# Patient Record
Sex: MALE | Race: WHITE
[De-identification: names, ages, dates, MRNs, and addresses within clinical notes are randomized per-mention and may not be internally consistent; named-entity substitution may affect disease eponyms.]

---

## 2018-08-29 ENCOUNTER — HOSPITAL ENCOUNTER (INPATIENT)
Dept: HOSPITAL 74 - YASAS | Age: 33
LOS: 5 days | Discharge: HOME | DRG: 773 | End: 2018-09-03
Attending: INTERNAL MEDICINE | Admitting: INTERNAL MEDICINE
Payer: COMMERCIAL

## 2018-08-29 VITALS — BODY MASS INDEX: 23.6 KG/M2

## 2018-08-29 DIAGNOSIS — F41.9: ICD-10-CM

## 2018-08-29 DIAGNOSIS — K21.9: ICD-10-CM

## 2018-08-29 DIAGNOSIS — F11.23: Primary | ICD-10-CM

## 2018-08-29 DIAGNOSIS — F17.213: ICD-10-CM

## 2018-08-29 DIAGNOSIS — F32.9: ICD-10-CM

## 2018-08-29 DIAGNOSIS — R63.4: ICD-10-CM

## 2018-08-29 PROCEDURE — HZ2ZZZZ DETOXIFICATION SERVICES FOR SUBSTANCE ABUSE TREATMENT: ICD-10-PCS

## 2018-08-29 NOTE — HP
COWS





- Scale


Resting Pulse: 0= [place] 80 or Below


Sweatin=Flushed/Facial Moisture


Restless Observation: 0= Sits Still


Pupil Size: 1= Pupils >than Normal


Bone or Joint Aches: 4=Acute Joint/Muscle Pain


Runny Nose/ Eye Tearin= Runny Nose/Eyes


GI Upset > 30mins: 2= Nausea/Diarrhea (diarrhea x 3)


Tremor Observation: 0= None


Yawning Observation: 0= None


Anxiety or Irritability: 4=Extreme Anxiety


Goose Flesh Skin: 0=Smooth Skin


COWS Score: 15





Admission ROS [facility]





- [facility]


Chief Complaint: 





Heroin withdrawal symptoms


Allergies/Adverse Reactions: 


 Allergies











Allergy/AdvReac Type Severity Reaction Status Date / Time


 


No Known Allergies Allergy   Verified 18 20:39











History of Present Illness: 





33 years old male with over 10 years history of heroin dependence is seeking 

admission to detox. Patient was last detoxed at [facility]. 

He reports 2 years of sobriety. He has medical medical history of anxiety, GERD 

and depression. He denies suicide attempt and suicidal ideation at this time.


Exam Limitations: No Limitations





- Ebola screening


Have you traveled outside of the country in the last 21 days: No


Have you had contact with anyone from an Ebola affected area: No


Have you been sick,other than usual withdrawal symptoms: No


Do you have a fever: No





- Review of Systems


Constitutional: Chills, Malaise, Night Sweats, Weakness, Unexplained wgt Loss (

10-15 pounds)


EENT: reports: Sinus Pressure


Respiratory: reports: No Symptoms reported


Cardiac: reports: No Symptoms Reported


GI: reports: Diarrhea (x 3), Nausea, Poor Appetite, Poor Fluid Intake, 

Abdominal cramping


: reports: No Symptoms Reported


Musculoskeletal: reports: Back Pain, Muscle Pain, Muscle Weakness


Integumentary: reports: Dryness


Neuro: reports: Headache, Tremors


Endocrine: reports: No Symptoms Reported


Hematology: reports: No Symptoms Reported


Psychiatric: reports: Orientated x3, Anxious, Depressed


Other Systems: Reviewed and Negative





Patient History





- Patient Medical History


Hx Anemia: No


Hx Asthma: No


Hx Chronic Obstructive Pulmonary Disease (COPD): No


Hx Cancer: No


Hx Cardiac Disorders: No


Hx Congestive Heart Failure: No


Hx Hypertension: No


Hx Hypercholesterolemia: No


Hx Pacemaker: No


HX Cerebrovascular Accident: No


Hx Seizures: No


Hx Dementia: No


Hx Diabetes: No


Hx Gastrointestinal Disorders: No


Hx Liver Disease: No


Hx Genitourinary Disorders: No


Hx Sexually Transmitted Disorders: No


Hx Renal Disease (ESRD): No


Hx Thyroid Disease: No


Hx Human Immunodeficiency Virus (HIV): No


Hx Hepatitis C: No


Hx Depression: Yes (Not on medication)


Hx Suicide Attempt: No (Denies suicide attempt and suicidal ideation at this 

time)


Hx Bipolar Disorder: No


Hx Schizophrenia: No


Other Medical History: ANXIETY -  Not on medication





- Patient Surgical History


Past Surgical History: No


Hx Neurologic Surgery: No


Hx Cataract Extraction: No


Hx Cardiac Surgery: No


Hx Lung Surgery: No


Hx Abdominal Surgery: No


Hx Appendectomy: No


Hx Cholecystectomy: No


Hx Genitourinary Surgery: No


Hx Orthopedic Surgery: No


Anesthesia Reaction: No





- PPD History


Previous Implant?: Yes


Documented Results: Negative w/proof


Date: 17


PPD to be Administered?: Yes





- Reproductive History


Patient is a Female of Child Bearing Age (11 -55 yrs old): No (Male)





- Smoking Cessation


Smoking history: Current every day smoker


Have you smoked in the past 12 months: Yes


Aproximately how many cigarettes per day: 10


Cigars Per Day: 0


Hx Chewing Tobacco Use: No


Initiated information on smoking cessation: Yes


'Breaking Loose' booklet given: 18





- Substance & Tx. History


Hx Alcohol Use: No


Hx Substance Use: Yes


Substance Use Type: Heroin


Hx Substance Use Treatment: Yes ([facility])





- Substances Abused


  ** Heroin


Route: Inhalation


Frequency: Daily


Amount used: 5 bags


Age of first use: 20


Date of Last Use: 18





Family Disease History





- Family Disease History


Family Disease History: Diabetes: Father, Heart Disease: Father, Other: Mother (

kidney)





Admission Physical Exam [facility]





- Vital Signs


Vital Signs: 


 Vital Signs - 24 hr











  18





  18:24


 


Temperature 97 F L


 


Pulse Rate 70


 


Respiratory 18





Rate 


 


Blood Pressure 129/70














- Physical


General Appearance: Yes: Moderate Distress, Tremorous, Irritable, Sweating, 

Anxious


HEENTM: Yes: EOMI, Normal ENT Inspection, Normocephalic, Normal Voice, CLINT


Respiratory: Yes: Lungs Clear, Normal Breath Sounds, No Respiratory Distress


Neck: Yes: Supple


Breast: Yes: Breast Exam Deferred


Cardiology: Yes: Regular Rhythm, Regular Rate


Abdominal: Yes: Normal Bowel Sounds, Soft


Genitourinary: Yes: Within Normal Limits


Neurological: Yes: Fully Oriented, Alert, Normal Mood/Affect


Integumentary: Yes: Normal Color, Warm


Lymphatic: Yes: Within Normal Limits





- Diagnostic


(1) Anxiety


Current Visit: Yes   Status: Chronic   





(2) Depression


Current Visit: Yes   Status: Chronic   





(3) GERD (gastroesophageal reflux disease)


Current Visit: Yes   Status: Chronic   


Qualifiers: 


   Esophagitis presence: without esophagitis   Qualified Code(s): K21.9 - Gastro

-esophageal reflux disease without esophagitis   





(4) Nicotine dependence


Current Visit: Yes   Status: Chronic   


Qualifiers: 


   Nicotine product type: cigarettes   Substance use status: in withdrawal   

Qualified Code(s): F17.213 - Nicotine dependence, cigarettes, with withdrawal   





(5) Opioid dependence with withdrawal


Current Visit: Yes   Status: Chronic   





Cleared for Admission S





- Detox or Rehab


[facility] Level of Care: Medically Managed


Detox Regimen/Protocol: Methadone





[facility] Breath Alcohol Content


Breath Alcohol Content: 0





Urine Drug Screen





- Results


Drug Screen Negative: No


Urine Drug Screen Results: OPI-Opiates, OXY-Oxycodone

## 2018-08-30 LAB
ALBUMIN SERPL-MCNC: 3.4 G/DL (ref 3.4–5)
ALP SERPL-CCNC: 82 U/L (ref 45–117)
ALT SERPL-CCNC: 27 U/L (ref 12–78)
ANION GAP SERPL CALC-SCNC: 8 MMOL/L (ref 8–16)
APPEARANCE UR: (no result)
AST SERPL-CCNC: 18 U/L (ref 15–37)
BILIRUB SERPL-MCNC: 1.3 MG/DL (ref 0.2–1)
BILIRUB UR STRIP.AUTO-MCNC: NEGATIVE MG/DL
BUN SERPL-MCNC: 13 MG/DL (ref 7–18)
CALCIUM SERPL-MCNC: 8.7 MG/DL (ref 8.5–10.1)
CHLORIDE SERPL-SCNC: 106 MMOL/L (ref 98–107)
CO2 SERPL-SCNC: 28 MMOL/L (ref 21–32)
COLOR UR: YELLOW
CREAT SERPL-MCNC: 0.7 MG/DL (ref 0.7–1.3)
DEPRECATED RDW RBC AUTO: 13.4 % (ref 11.9–15.9)
GLUCOSE SERPL-MCNC: 68 MG/DL (ref 74–106)
HCT VFR BLD CALC: 39.1 % (ref 35.4–49)
HGB BLD-MCNC: 12.9 GM/DL (ref 11.7–16.9)
KETONES UR QL STRIP: NEGATIVE
LEUKOCYTE ESTERASE UR QL STRIP.AUTO: NEGATIVE
MCH RBC QN AUTO: 28.9 PG (ref 25.7–33.7)
MCHC RBC AUTO-ENTMCNC: 32.9 G/DL (ref 32–35.9)
MCV RBC: 88.1 FL (ref 80–96)
NITRITE UR QL STRIP: NEGATIVE
PH UR: 7 [PH] (ref 5–8)
PLATELET # BLD AUTO: 290 K/MM3 (ref 134–434)
PMV BLD: 9 FL (ref 7.5–11.1)
POTASSIUM SERPLBLD-SCNC: 4.5 MMOL/L (ref 3.5–5.1)
PROT SERPL-MCNC: 6.4 G/DL (ref 6.4–8.2)
PROT UR QL STRIP: NEGATIVE
PROT UR QL STRIP: NEGATIVE
RBC # BLD AUTO: 4.44 M/MM3 (ref 4–5.6)
SODIUM SERPL-SCNC: 142 MMOL/L (ref 136–145)
SP GR UR: 1.01 (ref 1–1.03)
UROBILINOGEN UR STRIP-MCNC: NEGATIVE MG/DL (ref 0.2–1)
WBC # BLD AUTO: 7.5 K/MM3 (ref 4–10)

## 2018-08-30 RX ADMIN — Medication SCH MG: at 22:05

## 2018-08-30 RX ADMIN — TOLNAFTATE SCH APPLIC: 10 CREAM TOPICAL at 14:13

## 2018-08-30 RX ADMIN — NICOTINE SCH: 14 PATCH, EXTENDED RELEASE TRANSDERMAL at 10:32

## 2018-08-30 RX ADMIN — TOLNAFTATE SCH APPLIC: 10 CREAM TOPICAL at 22:06

## 2018-08-30 RX ADMIN — Medication SCH TAB: at 10:31

## 2018-08-30 NOTE — CONSULT
[facility] Psychiatric Consult





- Data


Date of interview: 08/30/18


Admission source: [facility]


Identifying data: Patient is a 33 year old male, , father of one, 

domiciled, and financially supported by family. This is one of multiple 

admissions for patient. Pt. admitted to  for opiate dependence.


Substance Abuse History: - Smoking Cessation.  Smoking history: Current every 

day smoker.  Have you smoked in the past 12 months: Yes.  Aproximately how many 

cigarettes per day: 10.  Cigars Per Day: 0.  Hx Chewing Tobacco Use: No.  

Initiated information on smoking cessation: Yes.  'Breaking Loose' booklet given

: 08/29/18.  - Substance & Tx. History.  Hx Alcohol Use: No.  Hx Substance Use: 

Yes.  Substance Use Type: Heroin.  Hx Substance Use Treatment: Yes ([facility] Felix).  - Substances Abused.  ** Heroin.  Route: Inhalation.  Frequency: 

Daily.  Amount used: 5 bags.  Age of first use: 20.  Date of Last Use: 08/29/18


Medical History: denies.


Psychiatric History: Patient's first psychiatric contact was at 17 years of age 

while in detox at [facility]. Pt. states he was retained on a 

psychiatric unit for fourteen days. He was diagnosed with Bipolar disorder and 

prescribed risperdal. Following discharge, pt. continued follow up care at 

[facility] outpatient clinic in which he was reevaulated and told by a 

psychiatrist that he was misdiagnose and did not have a diagnoses of bipolar 

disorder. Eventually patient discontinued outpatient psychiatric care and 

reports being mentally stable. Pt. is currently attending the [facility] 

outpatient program in [facility] and as per the requirement of the 

program see's a psychiatrist once a month. Pt. is not taking psychotropic 

medications.


Physical/Sexual Abuse/Trauma History: denies.





Mental Status Exam





- Mental Status Exam


Alert and Oriented to: Time, Place, Person


Cognitive Function: Good


Patient Appearance: Well Groomed


Mood: Hopeful


Affect: Appropriate


Patient Behavior: Appropriate, Cooperative


Speech Pattern: Clear, Appropriate


Voice Loudness: Normal


Thought Process: Intact, Goal Oriented


Thought Disorder: Not Present


Hallucinations: Denies


Suicidal Ideation: Denies


Homicidal Ideation: Denies


Insight/Judgement: Poor


Sleep: Fair


Appetite: Good


Muscle strength/Tone: Normal


Gait/Station: Normal





Psychiatric Findings





- Problem List (Axis 1, 2,3)


(1) Opioid dependence with withdrawal


Current Visit: Yes   Status: Acute   





(2) Nicotine dependence


Current Visit: Yes   Status: Chronic   


Qualifiers: 


   Nicotine product type: cigarettes   Substance use status: in withdrawal   

Qualified Code(s): F17.213 - Nicotine dependence, cigarettes, with withdrawal   





- Initial Treatment Plan


Initial Treatment Plan: Psychoeducation provided. Detoxification in progress. 

Observation.

## 2018-08-30 NOTE — PN
[facility] CIWA





- CIWA Score


Nausea/Vomiting: 3


Muscle Tremors: None


Anxiety: 3


Agitation: 3


Paroxysmal Sweats: 2


Orientation: 0-Oriented


Tacttile Disturbances: 2-Mild Itch/Numbness/Burn


Auditory Disturbances: 0-None


Visual Disturbances: 2-Mild Sensitivity


Headache: 0-None Present


CIWA-Ar Total Score: 15





[facility] COWS





- Scale


Resting Pulse: 0= [place] 80 or Below


Sweatin= Chills/Flushing





[facility] Progress Note (SOAP)


Subjective: 





Sweating, Diarrhea, Nausea.


Objective: 


PATIENT A & O X 3, OBSERVED AMBULATING ON UNIT. NO ACUTE DISTRESS.





18 17:26


 Vital Signs











Temperature  97.7 F   18 17:12


 


Pulse Rate  66   18 17:12


 


Respiratory Rate  18   18 17:12


 


Blood Pressure  107/66   18 17:12


 


O2 Sat by Pulse Oximetry (%)      








 Laboratory Tests











  18





  07:40 07:40 07:40


 


WBC  7.5  


 


RBC  4.44  


 


Hgb  12.9  


 


Hct  39.1  


 


MCV  88.1  


 


MCH  28.9  


 


MCHC  32.9  


 


RDW  13.4  


 


Plt Count  290  


 


MPV  9.0  


 


Sodium   142 


 


Potassium   4.5 


 


Chloride   106 


 


Carbon Dioxide   28 


 


Anion Gap   8 


 


BUN   13 


 


Creatinine   0.7 


 


Creat Clearance w eGFR   > 60 


 


Random Glucose   68 L 


 


Calcium   8.7 


 


Total Bilirubin   1.3 H 


 


AST   18  D 


 


ALT   27  D 


 


Alkaline Phosphatase   82 


 


Total Protein   6.4 


 


Albumin   3.4 


 


RPR Titer    Nonreactive








LABS NOTED.


UA RESULTS PENDING.


18 17:26





Assessment: 





18 17:26


WITHDRAWAL SYMPTOMS.


Plan: 





CONTINUE DETOX.

## 2018-08-31 RX ADMIN — NICOTINE POLACRILEX PRN MG: 2 GUM, CHEWING ORAL at 21:09

## 2018-08-31 RX ADMIN — Medication PRN MG: at 22:24

## 2018-08-31 RX ADMIN — Medication SCH MG: at 22:02

## 2018-08-31 RX ADMIN — TOLNAFTATE SCH APPLIC: 10 CREAM TOPICAL at 10:13

## 2018-08-31 RX ADMIN — NICOTINE POLACRILEX PRN MG: 2 GUM, CHEWING ORAL at 15:37

## 2018-08-31 RX ADMIN — NICOTINE SCH: 14 PATCH, EXTENDED RELEASE TRANSDERMAL at 10:16

## 2018-08-31 RX ADMIN — TOLNAFTATE SCH APPLIC: 10 CREAM TOPICAL at 22:04

## 2018-08-31 RX ADMIN — NICOTINE POLACRILEX PRN MG: 2 GUM, CHEWING ORAL at 12:22

## 2018-08-31 RX ADMIN — Medication SCH TAB: at 10:14

## 2018-09-01 RX ADMIN — NICOTINE SCH: 14 PATCH, EXTENDED RELEASE TRANSDERMAL at 10:12

## 2018-09-01 RX ADMIN — Medication SCH MG: at 22:36

## 2018-09-01 RX ADMIN — NICOTINE POLACRILEX PRN MG: 2 GUM, CHEWING ORAL at 11:16

## 2018-09-01 RX ADMIN — NICOTINE POLACRILEX PRN MG: 2 GUM, CHEWING ORAL at 19:09

## 2018-09-01 RX ADMIN — Medication PRN MG: at 22:37

## 2018-09-01 RX ADMIN — NICOTINE POLACRILEX PRN MG: 2 GUM, CHEWING ORAL at 13:16

## 2018-09-01 RX ADMIN — TOLNAFTATE SCH APPLIC: 10 CREAM TOPICAL at 10:12

## 2018-09-01 RX ADMIN — NICOTINE POLACRILEX PRN MG: 2 GUM, CHEWING ORAL at 08:32

## 2018-09-01 RX ADMIN — Medication SCH TAB: at 10:11

## 2018-09-01 RX ADMIN — TOLNAFTATE SCH APPLIC: 10 CREAM TOPICAL at 22:39

## 2018-09-01 NOTE — PN
[facility] COWS





- Scale


Resting Pulse: 0= [place] 80 or Below


Sweatin= No chills or Flushing


Restless Observation: 0= Sits Still


Pupil Size: 0= Normal to Room Light


Bone or Joint Aches: 0= None


Runny Nose/ Eye Tearin= None


GI Upset > 30mins: 0= None


Tremor Observation of Outstretched Hands: 0= None


Yawning Observation: 1= 1-2x During Session


Anxiety or Irritability: 1=Feels Anxious/Irritable


Goose Flesh Skin: 0=Smooth Skin


COWS Score: 2





[facility] Progress Note (SOAP)


Subjective: 





pt states doing OK, going to outpt program





Obj:


 Vital Signs - 24 hr











  18





  17:16 21:01 00:30


 


Temperature 98.0 F 97.7 F 


 


Pulse Rate 71 71 


 


Respiratory 18 18 18





Rate   


 


Blood Pressure 102/60 111/68 














  18





  03:30 06:16 06:30


 


Temperature  97.4 F L 


 


Pulse Rate  63 


 


Respiratory 18 18 18





Rate   


 


Blood Pressure  97/54 














  18





  11:21 14:31


 


Temperature 97.6 F 98.4 F


 


Pulse Rate 55 L 70


 


Respiratory 18 18





Rate  


 


Blood Pressure 111/69 99/63








 Laboratory Tests











  18





  07:40 07:40 07:40


 


WBC  7.5  


 


RBC  4.44  


 


Hgb  12.9  


 


Hct  39.1  


 


MCV  88.1  


 


MCH  28.9  


 


MCHC  32.9  


 


RDW  13.4  


 


Plt Count  290  


 


MPV  9.0  


 


Sodium   142 


 


Potassium   4.5 


 


Chloride   106 


 


Carbon Dioxide   28 


 


Anion Gap   8 


 


BUN   13 


 


Creatinine   0.7 


 


Creat Clearance w eGFR   > 60 


 


Random Glucose   68 L 


 


Calcium   8.7 


 


Total Bilirubin   1.3 H 


 


AST   18  D 


 


ALT   27  D 


 


Alkaline Phosphatase   82 


 


Total Protein   6.4 


 


Albumin   3.4 


 


Urine Color   


 


Urine Appearance   


 


Urine pH   


 


Ur Specific Gravity   


 


Urine Protein   


 


Urine Glucose (UA)   


 


Urine Ketones   


 


Urine Blood   


 


Urine Nitrite   


 


Urine Bilirubin   


 


Urine Urobilinogen   


 


Ur Leukocyte Esterase   


 


RPR Titer    Nonreactive














  18





  18:00


 


WBC 


 


RBC 


 


Hgb 


 


Hct 


 


MCV 


 


MCH 


 


MCHC 


 


RDW 


 


Plt Count 


 


MPV 


 


Sodium 


 


Potassium 


 


Chloride 


 


Carbon Dioxide 


 


Anion Gap 


 


BUN 


 


Creatinine 


 


Creat Clearance w eGFR 


 


Random Glucose 


 


Calcium 


 


Total Bilirubin 


 


AST 


 


ALT 


 


Alkaline Phosphatase 


 


Total Protein 


 


Albumin 


 


Urine Color  Yellow


 


Urine Appearance  Slcloudy


 


Urine pH  7.0


 


Ur Specific Gravity  1.015


 


Urine Protein  Negative


 


Urine Glucose (UA)  Negative


 


Urine Ketones  Negative


 


Urine Blood  Negative


 


Urine Nitrite  Negative


 


Urine Bilirubin  Negative


 


Urine Urobilinogen  Negative


 


Ur Leukocyte Esterase  Negative


 


RPR Titer 








labs VS WNL





Ass/plan: 33 years old male with over 10 years history of heroin dependence is 

here for detox- pt doing well continue detox protocol

## 2018-09-02 RX ADMIN — TOLNAFTATE SCH: 10 CREAM TOPICAL at 10:05

## 2018-09-02 RX ADMIN — Medication SCH TAB: at 10:05

## 2018-09-02 RX ADMIN — Medication PRN MG: at 22:02

## 2018-09-02 RX ADMIN — NICOTINE POLACRILEX PRN MG: 2 GUM, CHEWING ORAL at 22:02

## 2018-09-02 RX ADMIN — NICOTINE POLACRILEX PRN MG: 2 GUM, CHEWING ORAL at 08:54

## 2018-09-02 RX ADMIN — TOLNAFTATE SCH: 10 CREAM TOPICAL at 22:03

## 2018-09-02 RX ADMIN — NICOTINE SCH: 14 PATCH, EXTENDED RELEASE TRANSDERMAL at 10:05

## 2018-09-02 RX ADMIN — Medication SCH MG: at 22:02

## 2018-09-02 NOTE — PN
[facility] Progress Note (SOAP)


Subjective: 





pt without complaints today- says going for job interview, concerned about 

methadone in urine.





O: 


 Vital Signs - 24 hr











  09/01/18 09/01/18 09/01/18





  11:21 14:31 18:12


 


Temperature 97.6 F 98.4 F 97.6 F


 


Pulse Rate 55 L 70 62


 


Respiratory 18 18 18





Rate   


 


Blood Pressure 111/69 99/63 114/57














  09/01/18 09/02/18 09/02/18





  22:20 00:30 03:30


 


Temperature 98.9 F  


 


Pulse Rate 66  


 


Respiratory 18 18 18





Rate   


 


Blood Pressure 110/66  














  09/02/18





  05:58


 


Temperature 97 F L


 


Pulse Rate 67


 


Respiratory 16





Rate 


 


Blood Pressure 103/61








 Laboratory Tests











  08/30/18 08/30/18 08/30/18





  07:40 07:40 07:40


 


WBC  7.5  


 


RBC  4.44  


 


Hgb  12.9  


 


Hct  39.1  


 


MCV  88.1  


 


MCH  28.9  


 


MCHC  32.9  


 


RDW  13.4  


 


Plt Count  290  


 


MPV  9.0  


 


Sodium   142 


 


Potassium   4.5 


 


Chloride   106 


 


Carbon Dioxide   28 


 


Anion Gap   8 


 


BUN   13 


 


Creatinine   0.7 


 


Creat Clearance w eGFR   > 60 


 


Random Glucose   68 L 


 


Calcium   8.7 


 


Total Bilirubin   1.3 H 


 


AST   18  D 


 


ALT   27  D 


 


Alkaline Phosphatase   82 


 


Total Protein   6.4 


 


Albumin   3.4 


 


Urine Color   


 


Urine Appearance   


 


Urine pH   


 


Ur Specific Gravity   


 


Urine Protein   


 


Urine Glucose (UA)   


 


Urine Ketones   


 


Urine Blood   


 


Urine Nitrite   


 


Urine Bilirubin   


 


Urine Urobilinogen   


 


Ur Leukocyte Esterase   


 


RPR Titer    Nonreactive














  08/30/18





  18:00


 


WBC 


 


RBC 


 


Hgb 


 


Hct 


 


MCV 


 


MCH 


 


MCHC 


 


RDW 


 


Plt Count 


 


MPV 


 


Sodium 


 


Potassium 


 


Chloride 


 


Carbon Dioxide 


 


Anion Gap 


 


BUN 


 


Creatinine 


 


Creat Clearance w eGFR 


 


Random Glucose 


 


Calcium 


 


Total Bilirubin 


 


AST 


 


ALT 


 


Alkaline Phosphatase 


 


Total Protein 


 


Albumin 


 


Urine Color  Yellow


 


Urine Appearance  Slcloudy


 


Urine pH  7.0


 


Ur Specific Gravity  1.015


 


Urine Protein  Negative


 


Urine Glucose (UA)  Negative


 


Urine Ketones  Negative


 


Urine Blood  Negative


 


Urine Nitrite  Negative


 


Urine Bilirubin  Negative


 


Urine Urobilinogen  Negative


 


Ur Leukocyte Esterase  Negative


 


RPR Titer 








A/p: continue detox- pt to go home tomorrow

## 2018-09-03 VITALS — SYSTOLIC BLOOD PRESSURE: 95 MMHG | HEART RATE: 60 BPM | DIASTOLIC BLOOD PRESSURE: 63 MMHG | TEMPERATURE: 97.2 F

## 2018-09-03 NOTE — DS
[facility] Detox Discharge Summary


Admission Date: 


08/29/18





Discharge Date: 09/03/18





- History


Present History: Opioid Dependence


Additional Comments: 





DETOX COMPLETED.  ALERT O X 3. NAD.


Pertinent Past History: 





PLEASE SEE DX BELOW





- Physical Exam Results


Vital Signs: 


 Vital Signs











Temperature  97.2 F L  09/03/18 05:59


 


Pulse Rate  60   09/03/18 05:59


 


Respiratory Rate  18   09/03/18 06:30


 


Blood Pressure  95/63   09/03/18 05:59


 


O2 Sat by Pulse Oximetry (%)      











Pertinent Admission Physical Exam Findings: 





WITHDRAWAL SX





- Treatment


Hospital Course: Detox Protocol Followed, Detoxed Safely, Responded well, 

Discharged Condition Good





- Medication


Discharge Medications: 


Ambulatory Orders





Ranitidine [Zantac -] 150 mg PO BID #60 tablet 06/09/17 











- Diagnosis


(1) Opioid dependence with withdrawal


Status: Acute   





(2) Weight loss


Status: Acute   





(3) GERD (gastroesophageal reflux disease)


Status: Chronic   


Qualifiers: 


   Esophagitis presence: esophagitis presence not specified   Qualified Code(s)

: K21.9 - Gastro-esophageal reflux disease without esophagitis   





(4) Nicotine dependence


Status: Acute   


Qualifiers: 


   Nicotine product type: cigarettes   Substance use status: in withdrawal   

Qualified Code(s): F17.213 - Nicotine dependence, cigarettes, with withdrawal   





- AMA


Did Patient Leave Against Medical Advice: No

## 2018-09-03 NOTE — PN
[facility] Progress Note (SOAP)


Subjective: 





DETOX COMPLETED. ALERT O X 3. NAD. PT HAS A PRIMARY CARE PROVIDER DR NAKIA VILLA AT [facility].


Objective: 





09/03/18 14:49


 Laboratory Tests











  08/30/18 08/30/18 08/30/18





  07:40 07:40 07:40


 


WBC  7.5  


 


RBC  4.44  


 


Hgb  12.9  


 


Hct  39.1  


 


MCV  88.1  


 


MCH  28.9  


 


MCHC  32.9  


 


RDW  13.4  


 


Plt Count  290  


 


MPV  9.0  


 


Sodium   142 


 


Potassium   4.5 


 


Chloride   106 


 


Carbon Dioxide   28 


 


Anion Gap   8 


 


BUN   13 


 


Creatinine   0.7 


 


Creat Clearance w eGFR   > 60 


 


Random Glucose   68 L 


 


Calcium   8.7 


 


Total Bilirubin   1.3 H 


 


AST   18  D 


 


ALT   27  D 


 


Alkaline Phosphatase   82 


 


Total Protein   6.4 


 


Albumin   3.4 


 


Urine Color   


 


Urine Appearance   


 


Urine pH   


 


Ur Specific Gravity   


 


Urine Protein   


 


Urine Glucose (UA)   


 


Urine Ketones   


 


Urine Blood   


 


Urine Nitrite   


 


Urine Bilirubin   


 


Urine Urobilinogen   


 


Ur Leukocyte Esterase   


 


RPR Titer    Nonreactive














  08/30/18





  18:00


 


WBC 


 


RBC 


 


Hgb 


 


Hct 


 


MCV 


 


MCH 


 


MCHC 


 


RDW 


 


Plt Count 


 


MPV 


 


Sodium 


 


Potassium 


 


Chloride 


 


Carbon Dioxide 


 


Anion Gap 


 


BUN 


 


Creatinine 


 


Creat Clearance w eGFR 


 


Random Glucose 


 


Calcium 


 


Total Bilirubin 


 


AST 


 


ALT 


 


Alkaline Phosphatase 


 


Total Protein 


 


Albumin 


 


Urine Color  Yellow


 


Urine Appearance  Slcloudy


 


Urine pH  7.0


 


Ur Specific Gravity  1.015


 


Urine Protein  Negative


 


Urine Glucose (UA)  Negative


 


Urine Ketones  Negative


 


Urine Blood  Negative


 


Urine Nitrite  Negative


 


Urine Bilirubin  Negative


 


Urine Urobilinogen  Negative


 


Ur Leukocyte Esterase  Negative


 


RPR Titer 











Assessment: 





09/03/18 14:49


MEDICALLY STABLE


Plan: 





D/C PT TODAY

## 2018-11-19 ENCOUNTER — HOSPITAL ENCOUNTER (INPATIENT)
Dept: HOSPITAL 74 - YASAS | Age: 33
LOS: 2 days | Discharge: LEFT BEFORE DISCHARGE | DRG: 770 | End: 2018-11-21
Attending: NEUROMUSCULOSKELETAL MEDICINE & OMM | Admitting: NEUROMUSCULOSKELETAL MEDICINE & OMM
Payer: COMMERCIAL

## 2018-11-19 VITALS — BODY MASS INDEX: 21.9 KG/M2

## 2018-11-19 DIAGNOSIS — K21.9: ICD-10-CM

## 2018-11-19 DIAGNOSIS — F17.213: ICD-10-CM

## 2018-11-19 DIAGNOSIS — F11.23: Primary | ICD-10-CM

## 2018-11-19 DIAGNOSIS — F32.9: ICD-10-CM

## 2018-11-19 DIAGNOSIS — F41.9: ICD-10-CM

## 2018-11-19 DIAGNOSIS — F12.20: ICD-10-CM

## 2018-11-19 DIAGNOSIS — R63.4: ICD-10-CM

## 2018-11-19 PROCEDURE — HZ2ZZZZ DETOXIFICATION SERVICES FOR SUBSTANCE ABUSE TREATMENT: ICD-10-PCS | Performed by: NEUROMUSCULOSKELETAL MEDICINE & OMM

## 2018-11-19 RX ADMIN — Medication SCH MG: at 22:24

## 2018-11-19 RX ADMIN — NICOTINE POLACRILEX PRN MG: 2 GUM, CHEWING ORAL at 22:31

## 2018-11-19 NOTE — HP
COWS





- Scale


Resting Pulse: 0= [place] 80 or Below


Sweatin= Chills/Flushing


Restless Observation: 0= Sits Still


Pupil Size: 1= Pupils >than Normal


Bone or Joint Aches: 4=Acute Joint/Muscle Pain


Runny Nose/ Eye Tearin= Runny Nose/Eyes


GI Upset > 30mins: 2= Nausea/Diarrhea (diarrhea x 2)


Tremor Observation: 2= Slight Tremor Visible


Yawning Observation: 0= None


Anxiety or Irritability: 2=Irritable/Anxious


Goose Flesh Skin: 3=Piloerection


COWS Score: 17





CIWA Score





- Admission Criteria


[facility] Guidelines: Admission for Medically Managed Detox: 


Requires at least one of the followin. CIWA greater than 12


2. Seizures within the past 24 hours


3. Delirium tremens within the past 24 hours


4. Hallucinations within the past 24 hours


5. Acute intervention needed for co  occurring medical disorder


6. Acute intervention needed for co  occurring psychiatric disorder


7. Severe withdrawal that cannot be handled at a lower level of care (continued


    vomiting, continued diarrhea, abnormal vital signs) requiring intravenous


    medication and/or fluids


8. Pregnancy








Admission ROS [facility]


Chief Complaint: 





Herion withdrawal symptoms


Allergies/Adverse Reactions: 


 Allergies











Allergy/AdvReac Type Severity Reaction Status Date / Time


 


No Known Allergies Allergy   Verified 18 20:10











History of Present Illness: 





33 years old male with ten years history of heroin dependence is seeking 

admission to detox. Patient has been in previous detox and reports 2 years and 

eight months period of sobriety. He has medical history of anxiety, GERD, 

depression. He denies suicide attempt and suicidal ideation at this time.


Exam Limitations: No Limitations





- Ebola screening


Have you traveled outside of the country in the last 21 days: No


Have you had contact with anyone from an Ebola affected area: No


Have you been sick,other than usual withdrawal symptoms: No





- Review of Systems


Constitutional: Chills, Loss of Appetite, Malaise, Changes in sleep


EENT: reports: Sinus Pressure


Respiratory: reports: No Symptoms reported


Cardiac: reports: No Symptoms Reported


GI: reports: Diarrhea, Poor Appetite, Poor Fluid Intake, Rectal Bleeding, 

Abdominal cramping


: reports: No Symptoms Reported


Musculoskeletal: reports: No Symptoms Reported


Integumentary: reports: Dryness


Neuro: reports: Tremors


Endocrine: reports: No Symptoms Reported


Hematology: reports: No Symptoms Reported


Psychiatric: reports: Judgement Intact, Depressed


Other Systems: Reviewed and Negative





Patient History





- Patient Medical History


Hx Anemia: No


Hx Asthma: No


Hx Chronic Obstructive Pulmonary Disease (COPD): No


Hx Cancer: No


Hx Cardiac Disorders: No


Hx Congestive Heart Failure: No


Hx Hypertension: No


Hx Hypercholesterolemia: No


Hx Pacemaker: No


HX Cerebrovascular Accident: No


Hx Seizures: No


Hx Dementia: No


Hx Diabetes: No


Hx Gastrointestinal Disorders: No


Hx Liver Disease: No


Hx Genitourinary Disorders: No


Hx Sexually Transmitted Disorders: No


Hx Renal Disease (ESRD): No


Hx Thyroid Disease: No


Hx Human Immunodeficiency Virus (HIV): No


Hx Hepatitis C: No


Hx Depression: Yes (Not on medication)


Hx Suicide Attempt: No (Denies suicide attempt and suicidal ideation at this 

time)


Hx Bipolar Disorder: No


Hx Schizophrenia: No


Other Medical History: anxiety - Not on medication





- Patient Surgical History


Past Surgical History: No


Hx Neurologic Surgery: No


Hx Cataract Extraction: No


Hx Cardiac Surgery: No


Hx Lung Surgery: No


Hx Breast Surgery: No


Hx Breast Biopsy: No


Hx Abdominal Surgery: No


Hx Appendectomy: No


Hx Cholecystectomy: No


Hx Genitourinary Surgery: No


Hx  Section: No


Hx Orthopedic Surgery: No


Anesthesia Reaction: No





- PPD History


Date: 18





- Smoking Cessation


Smoking history: Current every day smoker


Have you smoked in the past 12 months: Yes


Aproximately how many cigarettes per day: 10


Cigars Per Day: 0


Hx Chewing Tobacco Use: No


Initiated information on smoking cessation: Yes


'Breaking Loose' booklet given: 18





- Substances Abused


  ** Heroin


Route: SNIFF


Frequency: Daily


Amount used: 1 BUNDLE


Age of first use: 21


Date of Last Use: 18





  ** Marijuana/Hashish


Route: Smoking


Frequency: 1-2 times per week


Amount used: $5


Age of first use: 17


Date of Last Use: 18





Family Disease History





- Family Disease History


Family Disease History: Diabetes: Father, Heart Disease: Father, Other: Mother (

kidney)





Admission Physical Exam [facility]





- Vital Signs


Vital Signs: 


 Vital Signs - 24 hr











  18





  19:55


 


Temperature 97.6 F


 


Pulse Rate 67


 


Respiratory 18





Rate 


 


Blood Pressure 123/74














- Physical


General Appearance: Yes: Moderate Distress, Sweating


HEENTM: Yes: Hearing grossly Normal, Normal ENT Inspection, Normocephalic, 

Normal Voice, CLINT


Respiratory: Yes: Lungs Clear, Normal Breath Sounds, No Respiratory Distress


Neck: Yes: Supple


Breast: Yes: Breast Exam Deferred


Cardiology: Yes: Regular Rhythm, Regular Rate


Abdominal: Yes: Normal Bowel Sounds


Genitourinary: Yes: Within Normal Limits


Back: Yes: Normal Inspection


Musculoskeletal: Yes: Muscle Pain


Extremities: Yes: Tremors


Neurological: Yes: CNs II-XII NML intact, Alert, Normal Mood/Affect, Normal 

Response


Integumentary: Yes: Warm


Lymphatic: Yes: Within Normal Limits





- Diagnostic


(1) Nicotine dependence


Current Visit: No   Status: Acute   


Qualifiers: 


   Nicotine product type: cigarettes   Substance use status: in withdrawal   

Qualified Code(s): F17.213 - Nicotine dependence, cigarettes, with withdrawal   





(2) Opioid dependence with withdrawal


Current Visit: No   Status: Acute   





(3) Anxiety


Current Visit: No   Status: Chronic   





(4) Cannabis dependence, uncomplicated


Current Visit: No   Status: Chronic   





(5) Depression


Current Visit: No   Status: Chronic   


Qualifiers: 


   Depression Type: unspecified   Qualified Code(s): F32.9 - Major depressive 

disorder, single episode, unspecified   





(6) GERD (gastroesophageal reflux disease)


Current Visit: No   Status: Chronic   


Qualifiers: 


   Esophagitis presence: esophagitis presence not specified   Qualified Code(s)

: K21.9 - Gastro-esophageal reflux disease without esophagitis   





Cleared for Admission [facility]





- Detox or Rehab


[facility] Level of Care: Medically Managed


Detox Regimen/Protocol: Methadone





[facility] Breath Alcohol Content


Breath Alcohol Content: 0





Urine Drug Screen





- Results


Urine Drug Screen Results: THC-Marijuana, OPI-Opiates, FEN-Fentanyl

## 2018-11-20 LAB
ALBUMIN SERPL-MCNC: 3.6 G/DL (ref 3.4–5)
ALP SERPL-CCNC: 94 U/L (ref 45–117)
ALT SERPL-CCNC: 34 U/L (ref 13–61)
ANION GAP SERPL CALC-SCNC: 11 MMOL/L (ref 8–16)
APPEARANCE UR: CLEAR
AST SERPL-CCNC: 19 U/L (ref 15–37)
BILIRUB SERPL-MCNC: 0.5 MG/DL (ref 0.2–1)
BILIRUB UR STRIP.AUTO-MCNC: NEGATIVE MG/DL
BUN SERPL-MCNC: 12 MG/DL (ref 7–18)
CALCIUM SERPL-MCNC: 8.8 MG/DL (ref 8.5–10.1)
CHLORIDE SERPL-SCNC: 102 MMOL/L (ref 98–107)
CO2 SERPL-SCNC: 25 MMOL/L (ref 21–32)
COLOR UR: YELLOW
CREAT SERPL-MCNC: 0.9 MG/DL (ref 0.55–1.3)
DEPRECATED RDW RBC AUTO: 13.6 % (ref 11.9–15.9)
GLUCOSE SERPL-MCNC: 77 MG/DL (ref 74–106)
HCT VFR BLD CALC: 41.7 % (ref 35.4–49)
HGB BLD-MCNC: 13.4 GM/DL (ref 11.7–16.9)
KETONES UR QL STRIP: NEGATIVE
LEUKOCYTE ESTERASE UR QL STRIP.AUTO: NEGATIVE
MCH RBC QN AUTO: 28.9 PG (ref 25.7–33.7)
MCHC RBC AUTO-ENTMCNC: 32.1 G/DL (ref 32–35.9)
MCV RBC: 90 FL (ref 80–96)
NITRITE UR QL STRIP: NEGATIVE
PH UR: 6 [PH] (ref 5–8)
PLATELET # BLD AUTO: 297 K/MM3 (ref 134–434)
PMV BLD: 8.8 FL (ref 7.5–11.1)
POTASSIUM SERPLBLD-SCNC: 4.3 MMOL/L (ref 3.5–5.1)
PROT SERPL-MCNC: 6.8 G/DL (ref 6.4–8.2)
PROT UR QL STRIP: NEGATIVE
PROT UR QL STRIP: NEGATIVE
RBC # BLD AUTO: 4.64 M/MM3 (ref 4–5.6)
SODIUM SERPL-SCNC: 139 MMOL/L (ref 136–145)
SP GR UR: 1.02 (ref 1.01–1.03)
UROBILINOGEN UR STRIP-MCNC: NEGATIVE MG/DL (ref 0.2–1)
WBC # BLD AUTO: 7.1 K/MM3 (ref 4–10)

## 2018-11-20 RX ADMIN — NICOTINE POLACRILEX PRN MG: 2 GUM, CHEWING ORAL at 10:35

## 2018-11-20 RX ADMIN — NICOTINE POLACRILEX PRN MG: 2 GUM, CHEWING ORAL at 17:31

## 2018-11-20 RX ADMIN — Medication SCH MG: at 22:34

## 2018-11-20 NOTE — CONSULT
[facility] Psychiatric Consult





- Data


Date of interview: 11/20/18


Admission source: [facility]


Identifying data: Patient is a 33 year old  male, father of one, 

unemployed, and is residing with family. This is one of multiple admisions for 

patient. Patient admitted to  for opiate dependence.


Substance Abuse History: - Smoking Cessation.  Smoking history: Current every 

day smoker.  Have you smoked in the past 12 months: Yes.  Aproximately how many 

cigarettes per day: 10.  Cigars Per Day: 0.  Hx Chewing Tobacco Use: No.  

Initiated information on smoking cessation: Yes.  'Breaking Loose' booklet given

: 11/19/18.  - Substances Abused.  ** Heroin.  Route: SNIFF.  Frequency: Daily.

  Amount used: 1 BUNDLE.  Age of first use: 21.  Date of Last Use: 11/19/18.  *

* Marijuana/Hashish.  Route: Smoking.  Frequency: 1-2 times per week.  Amount 

used: $5.  Age of first use: 17.  Date of Last Use: 11/19/18


Medical History: denies.


Psychiatric History: Patient seen by writer on 8/20/18 and reports no changes 

since his most recent admission to detox. Patient's first psychiatric contact 

was at 17 years of age while in detox at [facility]. Pt. states 

he was retained on a psychiatric unit for fourteen days. He was diagnosed with 

Bipolar disorder and prescribed risperdal. Following discharge, pt. continued 

follow up care at [facility] outpatient clinic in which he was reevaulated 

and told by a psychiatrist that he was misdiagnose and did not have a diagnoses 

of bipolar disorder. Eventually patient discontinued outpatient psychiatric 

care and reports being mentally stable. Pt. is currently attending the "[facility]

" outpatient program in [facility] and as per the requirement of the 

program see's the  psychiatrist Dr. giang once a month. Patient is not taking 

psychotropic medications.


Physical/Sexual Abuse/Trauma History: denies.





Mental Status Exam





- Mental Status Exam


Alert and Oriented to: Time, Place, Person


Cognitive Function: Good


Patient Appearance: Well Groomed


Mood: Hopeful, Euthymic


Affect: Appropriate, Mood Congruent


Patient Behavior: Appropriate, Cooperative


Speech Pattern: Clear, Appropriate


Voice Loudness: Normal


Thought Process: Intact, Goal Oriented


Thought Disorder: Not Present


Hallucinations: Denies


Suicidal Ideation: Denies


Homicidal Ideation: Denies


Insight/Judgement: Poor


Sleep: Fair


Appetite: Fair


Muscle strength/Tone: Normal


Gait/Station: Normal





Psychiatric Findings





- Problem List (Axis 1, 2,3)


(1) Nicotine dependence


Current Visit: Yes   Status: Chronic   


Qualifiers: 


   Nicotine product type: cigarettes   Substance use status: in withdrawal   

Qualified Code(s): F17.213 - Nicotine dependence, cigarettes, with withdrawal   





(2) Opioid dependence with withdrawal


Current Visit: Yes   Status: Acute   





(3) Cannabis dependence, uncomplicated


Current Visit: No   Status: Chronic   





- Initial Treatment Plan


Initial Treatment Plan: Psychoeducation provided. Detoxification in progress. 

Observation.

## 2018-11-20 NOTE — PN
[facility] COWS





- Scale


Resting Pulse: 0= [place] 80 or Below


Sweatin= Chills/Flushing


Restless Observation: 1= Difficult to Sit Still


Pupil Size: 1= Pupils >than Normal


Bone or Joint Aches: 1= Mild Discomfort


Runny Nose/ Eye Tearin= Nasal Congestion


GI Upset > 30mins: 2= Nausea/Diarrhea


Tremor Observation of Outstretched Hands: 2= Slight Tremor Visible


Yawning Observation: 2= >3x During Session


Anxiety or Irritability: 2=Irritable/Anxious


Goose Flesh Skin: 0=Smooth Skin


COWS Score: 13





[facility] Progress Note (SOAP)


Subjective: 





body ache muscle cramp tremor sweat trouble sleep at night


Objective: 





18 11:07


 Vital Signs











Temperature  98.1 F   18 09:33


 


Pulse Rate  69   18 09:33


 


Respiratory Rate  18   18 09:33


 


Blood Pressure  100/50 L  18 09:33


 


O2 Sat by Pulse Oximetry (%)      








 Laboratory Last Values











WBC  7.1 K/mm3 (4.0-10.0)   18  07:00    


 


RBC  4.64 M/mm3 (4.00-5.60)   18  07:00    


 


Hgb  13.4 GM/dL (11.7-16.9)   18  07:00    


 


Hct  41.7 % (35.4-49)   18  07:00    


 


MCV  90.0 fl (80-96)   18  07:00    


 


MCH  28.9 pg (25.7-33.7)   18  07:00    


 


MCHC  32.1 g/dl (32.0-35.9)   18  07:00    


 


RDW  13.6 % (11.9-15.9)   18  07:00    


 


Plt Count  297 K/MM3 (134-434)   18  07:00    


 


MPV  8.8 fl (7.5-11.1)   18  07:00    


 


Sodium  139 mmol/L (136-145)   18  07:00    


 


Potassium  4.3 mmol/L (3.5-5.1)   18  07:00    


 


Chloride  102 mmol/L ()   18  07:00    


 


Carbon Dioxide  25 mmol/L (21-32)   18  07:00    


 


Anion Gap  11 MMOL/L (8-16)   18  07:00    


 


BUN  12 mg/dL (7-18)   18  07:00    


 


Creatinine  0.9 mg/dL (0.55-1.3)   18  07:00    


 


Creat Clearance w eGFR  > 60  (>60)   18  07:00    


 


Random Glucose  77 mg/dL ()   18  07:00    


 


Calcium  8.8 mg/dL (8.5-10.1)   18  07:00    


 


Total Bilirubin  0.5 mg/dL (0.2-1)   18  07:00    


 


AST  19 U/L (15-37)   18  07:00    


 


ALT  34 U/L (13-61)   18  07:00    


 


Alkaline Phosphatase  94 U/L ()   18  07:00    


 


Total Protein  6.8 g/dl (6.4-8.2)   18  07:00    


 


Albumin  3.6 g/dl (3.4-5.0)   18  07:00    


 


Urine Color  Yellow   18  23:53    


 


Urine Appearance  Clear   18  23:53    


 


Urine pH  6.0  (5.0-8.0)   18  23:53    


 


Ur Specific Gravity  1.021  (1.010-1.035)   18  23:53    


 


Urine Protein  Negative  (NEGATIVE)   18  23:53    


 


Urine Glucose (UA)  Negative  (NEGATIVE)   18  23:53    


 


Urine Ketones  Negative  (NEGATIVE)   18  23:53    


 


Urine Blood  Negative  (NEGATIVE)   18  23:53    


 


Urine Nitrite  Negative  (NEGATIVE)   18  23:53    


 


Urine Bilirubin  Negative  (<2.0 mg/dL)   18  23:53    


 


Urine Urobilinogen  Negative mg/dL (0.2-1.0)   18  23:53    


 


Ur Leukocyte Esterase  Negative  (NEGATIVE)   18  23:53    








lab noted


Assessment: 





18 11:08


withdrawal sx


Plan: 





continue detox

## 2018-11-21 VITALS — HEART RATE: 82 BPM | TEMPERATURE: 97.7 F | SYSTOLIC BLOOD PRESSURE: 106 MMHG | DIASTOLIC BLOOD PRESSURE: 63 MMHG

## 2018-11-21 NOTE — DS
[facility] Detox Discharge Summary


Admission Date: 


11/19/18





Discharge Date: 11/21/18





- History


Present History: Opioid Dependence


Additional Comments: 





33 years old male admitted on 11/19/18 for opiate withdrawal sx


insists to leave the detox unit


patient is alert oriented x 3 no acute distress


denies suicidal denies homocidal no self destructive behavior 


aftercare [facility]





- Physical Exam Results


Vital Signs: 


 Vital Signs











Temperature  97.7 F   11/21/18 09:46


 


Pulse Rate  82   11/21/18 09:46


 


Respiratory Rate  18   11/21/18 09:46


 


Blood Pressure  106/63   11/21/18 09:46


 


O2 Sat by Pulse Oximetry (%)      











Pertinent Admission Physical Exam Findings: 





opiate withdrawal sx


 Vital Signs











Temperature  97.7 F   11/21/18 09:46


 


Pulse Rate  82   11/21/18 09:46


 


Respiratory Rate  18   11/21/18 09:46


 


Blood Pressure  106/63   11/21/18 09:46


 


O2 Sat by Pulse Oximetry (%)      








 Laboratory Last Values











WBC  7.1 K/mm3 (4.0-10.0)   11/20/18  07:00    


 


RBC  4.64 M/mm3 (4.00-5.60)   11/20/18  07:00    


 


Hgb  13.4 GM/dL (11.7-16.9)   11/20/18  07:00    


 


Hct  41.7 % (35.4-49)   11/20/18  07:00    


 


MCV  90.0 fl (80-96)   11/20/18  07:00    


 


MCH  28.9 pg (25.7-33.7)   11/20/18  07:00    


 


MCHC  32.1 g/dl (32.0-35.9)   11/20/18  07:00    


 


RDW  13.6 % (11.9-15.9)   11/20/18  07:00    


 


Plt Count  297 K/MM3 (134-434)   11/20/18  07:00    


 


MPV  8.8 fl (7.5-11.1)   11/20/18  07:00    


 


Sodium  139 mmol/L (136-145)   11/20/18  07:00    


 


Potassium  4.3 mmol/L (3.5-5.1)   11/20/18  07:00    


 


Chloride  102 mmol/L ()   11/20/18  07:00    


 


Carbon Dioxide  25 mmol/L (21-32)   11/20/18  07:00    


 


Anion Gap  11 MMOL/L (8-16)   11/20/18  07:00    


 


BUN  12 mg/dL (7-18)   11/20/18  07:00    


 


Creatinine  0.9 mg/dL (0.55-1.3)   11/20/18  07:00    


 


Creat Clearance w eGFR  > 60  (>60)   11/20/18  07:00    


 


Random Glucose  77 mg/dL ()   11/20/18  07:00    


 


Calcium  8.8 mg/dL (8.5-10.1)   11/20/18  07:00    


 


Total Bilirubin  0.5 mg/dL (0.2-1)   11/20/18  07:00    


 


AST  19 U/L (15-37)   11/20/18  07:00    


 


ALT  34 U/L (13-61)   11/20/18  07:00    


 


Alkaline Phosphatase  94 U/L ()   11/20/18  07:00    


 


Total Protein  6.8 g/dl (6.4-8.2)   11/20/18  07:00    


 


Albumin  3.6 g/dl (3.4-5.0)   11/20/18  07:00    


 


Urine Color  Yellow   11/19/18  23:53    


 


Urine Appearance  Clear   11/19/18  23:53    


 


Urine pH  6.0  (5.0-8.0)   11/19/18  23:53    


 


Ur Specific Gravity  1.021  (1.010-1.035)   11/19/18  23:53    


 


Urine Protein  Negative  (NEGATIVE)   11/19/18  23:53    


 


Urine Glucose (UA)  Negative  (NEGATIVE)   11/19/18  23:53    


 


Urine Ketones  Negative  (NEGATIVE)   11/19/18  23:53    


 


Urine Blood  Negative  (NEGATIVE)   11/19/18  23:53    


 


Urine Nitrite  Negative  (NEGATIVE)   11/19/18  23:53    


 


Urine Bilirubin  Negative  (<2.0 mg/dL)   11/19/18  23:53    


 


Urine Urobilinogen  Negative mg/dL (0.2-1.0)   11/19/18  23:53    


 


Ur Leukocyte Esterase  Negative  (NEGATIVE)   11/19/18  23:53    


 


RPR Titer  Nonreactive  (NONREACTIVE)   11/20/18  07:00    








lab noted





- Treatment


Hospital Course: Detox Protocol Followed, Responded well


Patient has Accepted a Rehab Referral to: [facility]





- Medication


Discharge Medications: 


Ambulatory Orders





NK [No Known Home Medication]  11/19/18 











- Diagnosis


(1) Opioid dependence with withdrawal


Status: Acute   





(2) Weight loss


Status: Acute   





(3) GERD (gastroesophageal reflux disease)


Status: Chronic   


Qualifiers: 


   Esophagitis presence: esophagitis presence not specified   Qualified Code(s)

: K21.9 - Gastro-esophageal reflux disease without esophagitis   





(4) Nicotine dependence


Status: Acute   


Qualifiers: 


   Nicotine product type: cigarettes   Substance use status: in withdrawal   

Qualified Code(s): F17.213 - Nicotine dependence, cigarettes, with withdrawal   





- AMA


Did Patient Leave Against Medical Advice: Yes